# Patient Record
Sex: FEMALE | Race: WHITE | NOT HISPANIC OR LATINO | URBAN - METROPOLITAN AREA
[De-identification: names, ages, dates, MRNs, and addresses within clinical notes are randomized per-mention and may not be internally consistent; named-entity substitution may affect disease eponyms.]

---
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
## 2018-12-18 NOTE — ED ADULT NURSE NOTE - TOBACCO SCREENING (FROM THE ASSIST)
Additional Anesthesia Volume In Cc (Will Not Render If 0): 0 Cryotherapy Text: The wound bed was treated with cryotherapy after the biopsy was performed. Consent: Written consent was obtained and risks were reviewed including but not limited to scarring, infection, bleeding, scabbing, incomplete removal, nerve damage and allergy to anesthesia. Detail Level: Detailed Wound Care: Aquaphor Bill For Surgical Tray: no Was A Bandage Applied: Yes Lab: -[id number] Notification Instructions: Patient will be notified of biopsy results. However, patient instructed to call the office if not contacted within 2 weeks. Biopsy Method: Dermablade Anesthesia Volume In Cc (Will Not Render If 0): 1 Electrodesiccation Text: The wound bed was treated with electrodesiccation after the biopsy was performed. Electrodesiccation And Curettage Text: The wound bed was treated with electrodesiccation and curettage after the biopsy was performed. Billing Type: Third-Party Bill Type Of Destruction Used: Curettage Dressing: bandage Path Notes (To The Dermatopathologist): Rule out recurrent bcc Size Of Lesion In Cm: 0.5 Curettage Text: The wound bed was treated with curettage after the biopsy was performed. Biopsy Type: H and E Anesthesia Type: 1% lidocaine with epinephrine Post-Care Instructions: I reviewed with the patient in detail post-care instructions. Patient is to keep the biopsy site dry overnight, then perform saline soaks twice a day for one week then once a day until healed. Apply Aquaphor after saline soak and cover with dressing.  Written instructions were provided to patient for wound care. Hemostasis: Electrocautery Depth Of Biopsy: dermis Silver Nitrate Text: The wound bed was treated with silver nitrate after the biopsy was performed. Size Of Lesion In Cm: 0.4 Path Notes (To The Dermatopathologist): Rule out irritated seb ker Size Of Lesion In Cm: 0.6 Statement Selected